# Patient Record
Sex: MALE | URBAN - METROPOLITAN AREA
[De-identification: names, ages, dates, MRNs, and addresses within clinical notes are randomized per-mention and may not be internally consistent; named-entity substitution may affect disease eponyms.]

---

## 2019-07-31 ENCOUNTER — APPOINTMENT (RX ONLY)
Dept: URBAN - METROPOLITAN AREA CLINIC 303 | Facility: CLINIC | Age: 29
Setting detail: DERMATOLOGY
End: 2019-07-31

## 2019-07-31 DIAGNOSIS — B08.1 MOLLUSCUM CONTAGIOSUM: ICD-10-CM

## 2019-07-31 PROBLEM — S09.8 OTHER SPECIFIED INJURIES OF HEAD: Status: ACTIVE | Noted: 2019-07-31

## 2019-07-31 PROCEDURE — 99201: CPT

## 2019-07-31 PROCEDURE — ? INVENTORY

## 2019-07-31 NOTE — PROCEDURE: MIPS QUALITY
Detail Level: Detailed
Quality 130: Documentation Of Current Medications In The Medical Record: Current Medications with Name, Dosage, Frequency, or Route not Documented, Reason not Given
Quality 47: Advance Care Plan: Advance care planning not documented, reason not otherwise specified.
Quality 431: Preventive Care And Screening: Unhealthy Alcohol Use - Screening: Unhealthy alcohol use screening not performed, reason not otherwise specified
Quality 226: Preventive Care And Screening: Tobacco Use: Screening And Cessation Intervention: Tobacco Screening not Performed for Unknown Reasons

## 2019-07-31 NOTE — HPI: INFECTION (MOLLUSCUM CONTAGIOSUM)
How Severe Are Your Molluscum?: mild
Is This A New Presentation, Or A Follow-Up?: Follow Up Molluscum Contagiosum
Additional History: Used imiquimod for 6 weeks, stopped for 2weeks. Thinks areas look good. Mainly was in suprapubic area.

## 2019-07-31 NOTE — PROCEDURE: REASSURANCE
Additional Notes (Optional): Molluscum resolved. If new spots flare, can restart imiquimod.
Hide Additional Notes?: No
Detail Level: Detailed